# Patient Record
Sex: MALE | Race: WHITE | NOT HISPANIC OR LATINO | Employment: OTHER | URBAN - METROPOLITAN AREA
[De-identification: names, ages, dates, MRNs, and addresses within clinical notes are randomized per-mention and may not be internally consistent; named-entity substitution may affect disease eponyms.]

---

## 2022-07-17 ENCOUNTER — OFFICE VISIT (OUTPATIENT)
Dept: URGENT CARE | Facility: URGENT CARE | Age: 67
End: 2022-07-17
Payer: MEDICARE

## 2022-07-17 VITALS
RESPIRATION RATE: 16 BRPM | DIASTOLIC BLOOD PRESSURE: 80 MMHG | TEMPERATURE: 98.1 F | OXYGEN SATURATION: 98 % | HEART RATE: 77 BPM | SYSTOLIC BLOOD PRESSURE: 138 MMHG

## 2022-07-17 DIAGNOSIS — H00.015 HORDEOLUM EXTERNUM OF LEFT LOWER EYELID: Primary | ICD-10-CM

## 2022-07-17 PROCEDURE — 99203 OFFICE O/P NEW LOW 30 MIN: CPT | Performed by: FAMILY MEDICINE

## 2022-07-17 RX ORDER — ERYTHROMYCIN 5 MG/G
0.5 OINTMENT OPHTHALMIC 4 TIMES DAILY
Qty: 3.5 G | Refills: 0 | Status: SHIPPED | OUTPATIENT
Start: 2022-07-17 | End: 2022-07-24

## 2022-07-17 RX ORDER — DOXAZOSIN 2 MG/1
2 TABLET ORAL
COMMUNITY
Start: 2021-08-31

## 2022-07-17 RX ORDER — CEPHALEXIN 500 MG/1
500 CAPSULE ORAL 2 TIMES DAILY
Qty: 14 CAPSULE | Refills: 0 | Status: SHIPPED | OUTPATIENT
Start: 2022-07-17 | End: 2022-07-24

## 2022-07-17 NOTE — PROGRESS NOTES
Chief complaint: left lower eyelid    Noticed 2 days ago bump on left lower eyelid   Getting worse    Concern as patient has root canal scheduled this week    BP elevated today but asymptomatic. No headache no blurring of vision no chest pain no shortness of breath no dizziness no unsteadiness no numbness no weakness    No blurring of vision no photophobia no eye pain no feeling of foreign body no history of eye trauma, yes contact lens wearer  denies eye pain  denies blurring of vision  denies URI symptoms  Tried supportive treatment no relief  Worsening symptoms hence came in    Problem list, Medication list, Allergies, and Medical/Social/Surgical histories reviewed in [facility] and updated as appropriate.    ROS:  General: negative for fever  EYE: as above  No fevers or chills chest pain or shortness of breath     OBJECTIVE:  /80   Pulse 77   Temp 98.1  F (36.7  C) (Tympanic)   Resp 16   SpO2 98%    General : Awake Alert not in any acute cardiorespiratory distress  Head:       Normocephalic Atraumatic  Eyes:    Pupils equally reactive to light and accomodation. Sclera not icteric. Extra occular muscles intact full and equal. No hyphema, no hypopyon, no ciliary flush. No periorbital cellulitis. No erythema of bulbar conjunctiva. Neurologic: No cranial nerve deficits.   Small erythematous swelling left lower eyelid   Psych: Appropriate mood and affect. Pleasant  Skin: patient undressed to level of his/her comfort. No visible concerning lesions.      ASSESSMENT:    ICD-10-CM    1. Hordeolum externum of left lower eyelid  H00.015 cephALEXin (KEFLEX) 500 MG capsule     erythromycin (ROMYCIN) 5 MG/GM ophthalmic ointment           PLAN:   Supportive treatment   Trial erythromycin ointment  Stop contacts until symptoms improve  Patient travelling back to [place], prescribed with keflex if symptoms do not improve sooner if worse.  Recommend eye follow up if no improvement in the next few days   Advised about  symptoms which might herald more serious problems.    adverse reactions of medication discussed  advised to come back in right away if with any worsening symptoms or if with no relief   aware to come in right away especially if with any blurring of vision, photophobia, pain, feeling of foreign body.   despite treatment plan  patient voiced understanding and had no further questions at this time.        Annita Vigil MD